# Patient Record
Sex: MALE | Race: WHITE | NOT HISPANIC OR LATINO
[De-identification: names, ages, dates, MRNs, and addresses within clinical notes are randomized per-mention and may not be internally consistent; named-entity substitution may affect disease eponyms.]

---

## 2022-03-07 PROBLEM — Z00.00 ENCOUNTER FOR PREVENTIVE HEALTH EXAMINATION: Status: ACTIVE | Noted: 2022-03-07

## 2022-03-16 ENCOUNTER — APPOINTMENT (OUTPATIENT)
Dept: OTOLARYNGOLOGY | Facility: CLINIC | Age: 37
End: 2022-03-16
Payer: MEDICAID

## 2022-03-16 VITALS
HEIGHT: 73.23 IN | WEIGHT: 315 LBS | BODY MASS INDEX: 41.3 KG/M2 | DIASTOLIC BLOOD PRESSURE: 101 MMHG | HEART RATE: 71 BPM | SYSTOLIC BLOOD PRESSURE: 162 MMHG

## 2022-03-16 DIAGNOSIS — Z78.9 OTHER SPECIFIED HEALTH STATUS: ICD-10-CM

## 2022-03-16 DIAGNOSIS — F17.210 NICOTINE DEPENDENCE, CIGARETTES, UNCOMPLICATED: ICD-10-CM

## 2022-03-16 PROCEDURE — G0268 REMOVAL OF IMPACTED WAX MD: CPT

## 2022-03-16 PROCEDURE — 92567 TYMPANOMETRY: CPT

## 2022-03-16 PROCEDURE — 99204 OFFICE O/P NEW MOD 45 MIN: CPT | Mod: 25

## 2022-03-16 PROCEDURE — 92557 COMPREHENSIVE HEARING TEST: CPT

## 2022-03-16 PROCEDURE — 31231 NASAL ENDOSCOPY DX: CPT

## 2022-03-16 NOTE — HISTORY OF PRESENT ILLNESS
[de-identified] : 37 year old male here for initial consultation for left ear otalgia. Patient reports pain started about 2-3 years. Patient reports occasional yellow fluid from ear. Patient reports not hearing well in left ear- states right ear hearing is stable. Denies recent hearing test. Patient denies ear infections, tinnitus, dizziness, vertigo, headaches related to hearing. Some nasal congestion. Hears well on right side. No epistaxis.

## 2022-03-24 ENCOUNTER — APPOINTMENT (OUTPATIENT)
Dept: CT IMAGING | Facility: IMAGING CENTER | Age: 37
End: 2022-03-24
Payer: MEDICAID

## 2022-03-24 ENCOUNTER — OUTPATIENT (OUTPATIENT)
Dept: OUTPATIENT SERVICES | Facility: HOSPITAL | Age: 37
LOS: 1 days | End: 2022-03-24
Payer: MEDICAID

## 2022-03-24 DIAGNOSIS — H71.92 UNSPECIFIED CHOLESTEATOMA, LEFT EAR: ICD-10-CM

## 2022-03-24 PROCEDURE — 70480 CT ORBIT/EAR/FOSSA W/O DYE: CPT

## 2022-03-24 PROCEDURE — 70480 CT ORBIT/EAR/FOSSA W/O DYE: CPT | Mod: 26

## 2022-04-21 ENCOUNTER — NON-APPOINTMENT (OUTPATIENT)
Age: 37
End: 2022-04-21

## 2022-04-21 ENCOUNTER — APPOINTMENT (OUTPATIENT)
Dept: OTOLARYNGOLOGY | Facility: CLINIC | Age: 37
End: 2022-04-21
Payer: MEDICAID

## 2022-04-21 VITALS
HEART RATE: 58 BPM | BODY MASS INDEX: 41.75 KG/M2 | WEIGHT: 315 LBS | DIASTOLIC BLOOD PRESSURE: 87 MMHG | HEIGHT: 73 IN | SYSTOLIC BLOOD PRESSURE: 143 MMHG

## 2022-04-21 PROCEDURE — 99214 OFFICE O/P EST MOD 30 MIN: CPT | Mod: 25

## 2022-04-21 PROCEDURE — 31231 NASAL ENDOSCOPY DX: CPT

## 2022-04-21 RX ORDER — CIPROFLOXACIN AND DEXAMETHASONE 3; 1 MG/ML; MG/ML
0.3-0.1 SUSPENSION/ DROPS AURICULAR (OTIC) TWICE DAILY
Qty: 1 | Refills: 1 | Status: COMPLETED | COMMUNITY
Start: 2022-03-16 | End: 2022-04-21

## 2022-04-21 RX ORDER — OFLOXACIN OTIC 3 MG/ML
0.3 SOLUTION AURICULAR (OTIC) TWICE DAILY
Qty: 1 | Refills: 1 | Status: ACTIVE | COMMUNITY
Start: 2022-03-24 | End: 1900-01-01

## 2022-04-21 NOTE — HISTORY OF PRESENT ILLNESS
[de-identified] : 37 year old male presents for follow up for left ear otalgia. States doing much better, completed the otic drops, and hearing is much better.  Patient denies otalgia, otorrhea, fevers, ear infections, tinnitus, dizziness, vertigo, or headaches related to hearing. Currently using the Flonase daily. Left ear feels better. \par \par CT Temporal Bones- 3/24/2022\par IMPRESSION: Left-sided otitis externa.\par Thickening and retraction of the left tympanic membrane which may reflect changes of myringosclerosis. No gross perforation.\par Mild changes of left-sided otitis media with a tiny amount of nonspecific soft tissue within the anterior left epitympanum/Prussak's space without associated scutal blunting or ossicular chain erosion. Superimposed cholesteatoma formation cannot be fully excluded.\par Thinning of the bony covering of the left superior semicircular canal without linda dehiscence.\par Unremarkable right-sided temporal bone CT study.\par +oPACIFICATION OF THE SINUSES\par \par

## 2022-04-21 NOTE — REASON FOR VISIT
[FreeTextEntry2] : left ear otalgia.  [Interpreters_IDNumber] : [id number] [Interpreters_FullName] : Kylah

## 2022-06-24 ENCOUNTER — NON-APPOINTMENT (OUTPATIENT)
Age: 37
End: 2022-06-24

## 2022-06-26 ENCOUNTER — RX RENEWAL (OUTPATIENT)
Age: 37
End: 2022-06-26

## 2022-09-15 ENCOUNTER — APPOINTMENT (OUTPATIENT)
Dept: OTOLARYNGOLOGY | Facility: CLINIC | Age: 37
End: 2022-09-15

## 2023-02-02 ENCOUNTER — APPOINTMENT (OUTPATIENT)
Dept: OTOLARYNGOLOGY | Facility: CLINIC | Age: 38
End: 2023-02-02

## 2023-06-01 ENCOUNTER — APPOINTMENT (OUTPATIENT)
Dept: OTOLARYNGOLOGY | Facility: CLINIC | Age: 38
End: 2023-06-01
Payer: MEDICAID

## 2023-06-01 PROCEDURE — 31231 NASAL ENDOSCOPY DX: CPT

## 2023-06-01 PROCEDURE — 92557 COMPREHENSIVE HEARING TEST: CPT

## 2023-06-01 PROCEDURE — G0268 REMOVAL OF IMPACTED WAX MD: CPT

## 2023-06-01 PROCEDURE — 99214 OFFICE O/P EST MOD 30 MIN: CPT | Mod: 25

## 2023-06-01 PROCEDURE — 92567 TYMPANOMETRY: CPT

## 2023-06-01 RX ORDER — OFLOXACIN OTIC 3 MG/ML
0.3 SOLUTION AURICULAR (OTIC) TWICE DAILY
Qty: 1 | Refills: 1 | Status: ACTIVE | COMMUNITY
Start: 2023-06-01 | End: 1900-01-01

## 2023-06-01 NOTE — REASON FOR VISIT
[Subsequent Evaluation] : a subsequent evaluation for [FreeTextEntry2] : left otalgia [Interpreters_IDNumber] : [id number] [Interpreters_FullName] : Sherice [FreeTextEntry3] : Bahamian [TWNoteComboBox1] : Other

## 2023-06-01 NOTE — HISTORY OF PRESENT ILLNESS
[de-identified] : 38 year old male presents for follow up of ears\par States after showers he has yellow ear drainage - used to use ear drops but does not have any more to use\par Denies otalgia, ear infections, hearing loss, tinnitus, dizziness, vertigo, headaches related to hearing. \par Audio in 2022 showed AS [facility]\par

## 2023-06-01 NOTE — REASON FOR VISIT
[Subsequent Evaluation] : a subsequent evaluation for [FreeTextEntry2] : left otalgia [Interpreters_IDNumber] : [id number] [Interpreters_FullName] : Sherice [FreeTextEntry3] : [place] [TWNoteComboBox1] : Other

## 2023-06-01 NOTE — DATA REVIEWED
[de-identified] : Audiogram ordered to assess for sensorineural +/- conductive hearing loss\par Results: normal hearing AU, much better

## 2023-06-01 NOTE — HISTORY OF PRESENT ILLNESS
[de-identified] : 38 year old male presents for follow up of ears\par States after showers he has yellow ear drainage - used to use ear drops but does not have any more to use\par Denies otalgia, ear infections, hearing loss, tinnitus, dizziness, vertigo, headaches related to hearing. \par Audio in 2022 showed AS [facility]\par

## 2023-06-01 NOTE — DATA REVIEWED
[de-identified] : Audiogram ordered to assess for sensorineural +/- conductive hearing loss\par Results: normal hearing AU, much better

## 2023-11-02 ENCOUNTER — APPOINTMENT (OUTPATIENT)
Dept: OTOLARYNGOLOGY | Facility: CLINIC | Age: 38
End: 2023-11-02
Payer: MEDICAID

## 2023-11-02 VITALS
DIASTOLIC BLOOD PRESSURE: 85 MMHG | SYSTOLIC BLOOD PRESSURE: 128 MMHG | BODY MASS INDEX: 41.75 KG/M2 | WEIGHT: 315 LBS | HEIGHT: 72.83 IN | HEART RATE: 77 BPM

## 2023-11-02 DIAGNOSIS — J32.9 CHRONIC SINUSITIS, UNSPECIFIED: ICD-10-CM

## 2023-11-02 DIAGNOSIS — H73.002 ACUTE MYRINGITIS, LEFT EAR: ICD-10-CM

## 2023-11-02 DIAGNOSIS — H71.92 UNSPECIFIED CHOLESTEATOMA, LEFT EAR: ICD-10-CM

## 2023-11-02 DIAGNOSIS — H90.11 CONDUCTIVE HEARING LOSS, UNILATERAL, RIGHT EAR, WITH UNRESTRICTED HEARING ON THE CONTRALATERAL SIDE: ICD-10-CM

## 2023-11-02 DIAGNOSIS — H92.02 OTALGIA, LEFT EAR: ICD-10-CM

## 2023-11-02 DIAGNOSIS — R09.81 NASAL CONGESTION: ICD-10-CM

## 2023-11-02 DIAGNOSIS — G47.30 SLEEP APNEA, UNSPECIFIED: ICD-10-CM

## 2023-11-02 PROCEDURE — 69210 REMOVE IMPACTED EAR WAX UNI: CPT

## 2023-11-02 PROCEDURE — 31231 NASAL ENDOSCOPY DX: CPT

## 2023-11-02 PROCEDURE — 99213 OFFICE O/P EST LOW 20 MIN: CPT | Mod: 25

## 2023-11-02 RX ORDER — AZELASTINE HYDROCHLORIDE 137 UG/1
0.1 SPRAY, METERED NASAL
Qty: 1 | Refills: 1 | Status: ACTIVE | COMMUNITY
Start: 2022-04-21 | End: 1900-01-01

## 2023-11-02 RX ORDER — FLUTICASONE PROPIONATE 50 UG/1
50 SPRAY, METERED NASAL
Qty: 1 | Refills: 2 | Status: ACTIVE | COMMUNITY
Start: 2022-03-16 | End: 1900-01-01